# Patient Record
Sex: MALE | Race: WHITE
[De-identification: names, ages, dates, MRNs, and addresses within clinical notes are randomized per-mention and may not be internally consistent; named-entity substitution may affect disease eponyms.]

---

## 2018-01-13 ENCOUNTER — HOSPITAL ENCOUNTER (EMERGENCY)
Dept: HOSPITAL 4 - SED | Age: 78
Discharge: HOME | End: 2018-01-13
Payer: COMMERCIAL

## 2018-01-13 VITALS — WEIGHT: 134 LBS | HEIGHT: 69 IN | BODY MASS INDEX: 19.85 KG/M2

## 2018-01-13 VITALS — SYSTOLIC BLOOD PRESSURE: 164 MMHG

## 2018-01-13 VITALS — SYSTOLIC BLOOD PRESSURE: 155 MMHG

## 2018-01-13 DIAGNOSIS — K21.9: ICD-10-CM

## 2018-01-13 DIAGNOSIS — Z87.442: ICD-10-CM

## 2018-01-13 DIAGNOSIS — I10: Primary | ICD-10-CM

## 2018-01-13 DIAGNOSIS — Z79.899: ICD-10-CM

## 2018-01-13 DIAGNOSIS — Z97.8: ICD-10-CM

## 2018-01-13 DIAGNOSIS — Z90.49: ICD-10-CM

## 2018-08-10 LAB
BASOPHILS NFR BLD MANUAL: 0 % (ref 0–2)
EOSINOPHIL NFR BLD MANUAL: 2 % (ref 0–7)
ERYTHROCYTE [DISTWIDTH] IN BLOOD BY AUTOMATED COUNT: 19.6 % (ref 9–15)
HCT VFR BLD AUTO: 33.3 % (ref 36–54)
HGB BLD-MCNC: 11.3 G/DL (ref 14–18)
LYMPHOCYTES NFR BLD MANUAL: 22 % (ref 20–46)
MCH RBC QN AUTO: 37 PG (ref 27–31)
MCHC RBC AUTO-ENTMCNC: 34 % (ref 32–36)
MCV RBC AUTO: 109 FL (ref 79–98)
MONOCYTES # BLD MANUAL: 6 % (ref 0–11)
NEUTS BAND NFR BLD MANUAL: 2 % (ref 0–6)
PLATELET # BLD AUTO: 181 K/UL (ref 130–430)
RBC # BLD AUTO: 3.06 MIL/UL (ref 4.2–6.2)
WBC # BLD AUTO: 7.7 K/UL (ref 4.8–10.8)

## 2018-08-16 ENCOUNTER — HOSPITAL ENCOUNTER (INPATIENT)
Dept: HOSPITAL 4 - SMU | Age: 78
LOS: 1 days | Discharge: HOME | DRG: 627 | End: 2018-08-17
Attending: OTOLARYNGOLOGY | Admitting: OTOLARYNGOLOGY
Payer: COMMERCIAL

## 2018-08-16 VITALS — HEIGHT: 69 IN | BODY MASS INDEX: 19.26 KG/M2 | WEIGHT: 130 LBS

## 2018-08-16 VITALS — SYSTOLIC BLOOD PRESSURE: 138 MMHG

## 2018-08-16 VITALS — SYSTOLIC BLOOD PRESSURE: 120 MMHG

## 2018-08-16 DIAGNOSIS — I25.10: ICD-10-CM

## 2018-08-16 DIAGNOSIS — J44.9: ICD-10-CM

## 2018-08-16 DIAGNOSIS — E04.1: Primary | ICD-10-CM

## 2018-08-16 DIAGNOSIS — Z95.5: ICD-10-CM

## 2018-08-16 DIAGNOSIS — Z91.011: ICD-10-CM

## 2018-08-16 DIAGNOSIS — Z79.899: ICD-10-CM

## 2018-08-16 DIAGNOSIS — K21.9: ICD-10-CM

## 2018-08-16 DIAGNOSIS — Z90.49: ICD-10-CM

## 2018-08-16 DIAGNOSIS — I10: ICD-10-CM

## 2018-08-16 DIAGNOSIS — H40.9: ICD-10-CM

## 2018-08-16 DIAGNOSIS — Z88.6: ICD-10-CM

## 2018-08-16 LAB
ALBUMIN SERPL BCP-MCNC: 3.4 G/DL (ref 3.4–4.8)
CALCIUM SERPL-MCNC: 8.2 MG/DL (ref 8.4–11)

## 2018-08-16 PROCEDURE — C1782 MORCELLATOR: HCPCS

## 2018-08-16 PROCEDURE — 0GTK0ZZ RESECTION OF THYROID GLAND, OPEN APPROACH: ICD-10-PCS | Performed by: OTOLARYNGOLOGY

## 2018-08-16 PROCEDURE — 4A1174G MONITORING OF PERIPHERAL NERVOUS ELECTRICAL ACTIVITY, INTRAOPERATIVE, VIA NATURAL OR ARTIFICIAL OPENING: ICD-10-PCS | Performed by: OTOLARYNGOLOGY

## 2018-08-16 RX ADMIN — POTASSIUM CHLORIDE, DEXTROSE MONOHYDRATE AND SODIUM CHLORIDE SCH MLS/HR: 150; 5; 450 INJECTION, SOLUTION INTRAVENOUS at 14:54

## 2018-08-16 RX ADMIN — CALCIUM SCH MG: 500 TABLET ORAL at 21:01

## 2018-08-17 VITALS — SYSTOLIC BLOOD PRESSURE: 150 MMHG

## 2018-08-17 VITALS — SYSTOLIC BLOOD PRESSURE: 117 MMHG

## 2018-08-17 VITALS — SYSTOLIC BLOOD PRESSURE: 143 MMHG

## 2018-08-17 VITALS — SYSTOLIC BLOOD PRESSURE: 138 MMHG

## 2018-08-17 VITALS — SYSTOLIC BLOOD PRESSURE: 144 MMHG

## 2018-08-17 VITALS — SYSTOLIC BLOOD PRESSURE: 125 MMHG

## 2018-08-17 LAB
ALBUMIN SERPL BCP-MCNC: 3.3 G/DL (ref 3.4–4.8)
CALCIUM SERPL-MCNC: 8.3 MG/DL (ref 8.4–11)

## 2018-08-17 RX ADMIN — CALCIUM SCH MG: 500 TABLET ORAL at 15:53

## 2018-08-17 RX ADMIN — POTASSIUM CHLORIDE, DEXTROSE MONOHYDRATE AND SODIUM CHLORIDE SCH MLS/HR: 150; 5; 450 INJECTION, SOLUTION INTRAVENOUS at 10:39

## 2018-08-17 RX ADMIN — CALCIUM SCH MG: 500 TABLET ORAL at 08:11

## 2018-08-17 RX ADMIN — POTASSIUM CHLORIDE, DEXTROSE MONOHYDRATE AND SODIUM CHLORIDE SCH MLS/HR: 150; 5; 450 INJECTION, SOLUTION INTRAVENOUS at 00:49

## 2019-03-04 ENCOUNTER — HOSPITAL ENCOUNTER (INPATIENT)
Dept: HOSPITAL 4 - SED | Age: 79
LOS: 2 days | Discharge: HOME | DRG: 204 | End: 2019-03-06
Attending: SPECIALIST | Admitting: SPECIALIST
Payer: COMMERCIAL

## 2019-03-04 VITALS — BODY MASS INDEX: 19.11 KG/M2 | WEIGHT: 129 LBS | HEIGHT: 69 IN

## 2019-03-04 VITALS — SYSTOLIC BLOOD PRESSURE: 186 MMHG

## 2019-03-04 VITALS — SYSTOLIC BLOOD PRESSURE: 135 MMHG

## 2019-03-04 VITALS — SYSTOLIC BLOOD PRESSURE: 148 MMHG

## 2019-03-04 VITALS — SYSTOLIC BLOOD PRESSURE: 146 MMHG

## 2019-03-04 DIAGNOSIS — Z95.1: ICD-10-CM

## 2019-03-04 DIAGNOSIS — Z88.8: ICD-10-CM

## 2019-03-04 DIAGNOSIS — K21.9: ICD-10-CM

## 2019-03-04 DIAGNOSIS — Z79.02: ICD-10-CM

## 2019-03-04 DIAGNOSIS — R91.8: ICD-10-CM

## 2019-03-04 DIAGNOSIS — H40.9: ICD-10-CM

## 2019-03-04 DIAGNOSIS — Z90.49: ICD-10-CM

## 2019-03-04 DIAGNOSIS — I10: ICD-10-CM

## 2019-03-04 DIAGNOSIS — D64.9: ICD-10-CM

## 2019-03-04 DIAGNOSIS — R04.2: Primary | ICD-10-CM

## 2019-03-04 DIAGNOSIS — E87.1: ICD-10-CM

## 2019-03-04 DIAGNOSIS — I25.10: ICD-10-CM

## 2019-03-04 LAB
ALBUMIN SERPL BCP-MCNC: 3 G/DL (ref 3.4–4.8)
ALT SERPL W P-5'-P-CCNC: 15 U/L (ref 12–78)
ANION GAP SERPL CALCULATED.3IONS-SCNC: 6 MMOL/L (ref 5–15)
AST SERPL W P-5'-P-CCNC: 17 U/L (ref 10–37)
BASOPHILS # BLD AUTO: 0.1 K/UL (ref 0–0.2)
BASOPHILS NFR BLD AUTO: 0.5 % (ref 0–2)
BILIRUB SERPL-MCNC: 0.4 MG/DL (ref 0–1)
BUN SERPL-MCNC: 18 MG/DL (ref 8–21)
CALCIUM SERPL-MCNC: 8.5 MG/DL (ref 8.4–11)
CHLORIDE SERPL-SCNC: 95 MMOL/L (ref 98–107)
CREAT SERPL-MCNC: 0.98 MG/DL (ref 0.55–1.3)
EOSINOPHIL # BLD AUTO: 0.1 K/UL (ref 0–0.4)
EOSINOPHIL NFR BLD AUTO: 0.7 % (ref 0–4)
ERYTHROCYTE [DISTWIDTH] IN BLOOD BY AUTOMATED COUNT: 19.4 % (ref 9–15)
GFR SERPL CREATININE-BSD FRML MDRD: (no result) ML/MIN (ref 90–?)
GLUCOSE SERPL-MCNC: 121 MG/DL (ref 70–99)
HCT VFR BLD AUTO: 27.6 % (ref 36–54)
HGB BLD-MCNC: 9.4 G/DL (ref 14–18)
INR PPP: 1 (ref 0.8–1.2)
LYMPHOCYTES # BLD AUTO: 1 K/UL (ref 1–5.5)
LYMPHOCYTES NFR BLD AUTO: 9.4 % (ref 20.5–51.5)
MCH RBC QN AUTO: 37 PG (ref 27–31)
MCHC RBC AUTO-ENTMCNC: 34 % (ref 32–36)
MCV RBC AUTO: 109 FL (ref 79–98)
MONOCYTES # BLD MANUAL: 0.9 K/UL (ref 0–1)
MONOCYTES # BLD MANUAL: 8.1 % (ref 1.7–9.3)
NEUTROPHILS # BLD AUTO: 9 K/UL (ref 1.8–7.7)
NEUTROPHILS NFR BLD AUTO: 81.3 % (ref 40–70)
PLATELET # BLD AUTO: 384 K/UL (ref 130–430)
POTASSIUM SERPL-SCNC: 4.3 MMOL/L (ref 3.5–5.1)
PROTHROMBIN TIME: 10 SECS (ref 9.5–12.5)
RBC # BLD AUTO: 2.54 MIL/UL (ref 4.2–6.2)
SODIUM SERPLBLD-SCNC: 127 MMOL/L (ref 136–145)
WBC # BLD AUTO: 11.1 K/UL (ref 4.8–10.8)

## 2019-03-04 RX ADMIN — DORZOLAMIDE HYDROCHLORIDE AND TIMOLOL MALEATE SCH ML: 20; 5 SOLUTION/ DROPS OPHTHALMIC at 21:00

## 2019-03-04 RX ADMIN — DEXTROSE MONOHYDRATE SCH MLS/HR: 50 INJECTION, SOLUTION INTRAVENOUS at 21:27

## 2019-03-04 NOTE — NUR
OPENING NOTES

Pt and endorsement received from day shift nurse. Pt is AAOx4, lying in bed. Wife [place] at 
bedside. No complains of pain at this time. No signs of acute distress or SOB noted. 
Encouraged to use call light when needed. Safety precautions in place with 3 side rails up, 
bed alarm on, locked and in lowest position. Call light with pt. Will continue to monitor.

## 2019-03-04 NOTE — NUR
Notes

In bed, family at bedside. Admit nurse at bedside doing admission data. No acute distress 
noted. make patient comfortable in bed. will endorse.

## 2019-03-04 NOTE — NUR
Admission Note

Received patient from ER with diagnosis of hemoptasis. Initial Plan of Care 
discussed-patient verbalized understanding. Family at bedside. Oriented to room, call light, 
pain management and safety.
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
## 2019-03-04 NOTE — NUR
RESTING

Pt is resting in bed with both eyes closed. With visible chest rise and fall with unlabored 
breathing noted. No complains of pain and no signs of acute distress or SOB noted. Safety 
precautions in place and call light with pt. Will continue to monitor.

## 2019-03-04 NOTE — NUR
Patient will be admitted to care of .  Admitted to telemetry unit.  
Will go to room 114B.    Summary report printed. Report will be given at 
bedside.

## 2019-03-05 VITALS — SYSTOLIC BLOOD PRESSURE: 126 MMHG

## 2019-03-05 VITALS — SYSTOLIC BLOOD PRESSURE: 143 MMHG

## 2019-03-05 VITALS — SYSTOLIC BLOOD PRESSURE: 132 MMHG

## 2019-03-05 VITALS — SYSTOLIC BLOOD PRESSURE: 149 MMHG

## 2019-03-05 LAB
ANION GAP SERPL CALCULATED.3IONS-SCNC: 7 MMOL/L (ref 5–15)
BUN SERPL-MCNC: 15 MG/DL (ref 8–21)
CALCIUM SERPL-MCNC: 8 MG/DL (ref 8.4–11)
CHLORIDE SERPL-SCNC: 97 MMOL/L (ref 98–107)
CREAT SERPL-MCNC: 0.94 MG/DL (ref 0.55–1.3)
GFR SERPL CREATININE-BSD FRML MDRD: (no result) ML/MIN (ref 90–?)
GLUCOSE SERPL-MCNC: 98 MG/DL (ref 70–99)
POTASSIUM SERPL-SCNC: 3.9 MMOL/L (ref 3.5–5.1)
SODIUM SERPLBLD-SCNC: 130 MMOL/L (ref 136–145)
TSH SERPL DL<=0.05 MIU/L-ACNC: 3.1 UIU/ML (ref 0.34–4.82)

## 2019-03-05 RX ADMIN — LOSARTAN POTASSIUM SCH MG: 50 TABLET, FILM COATED ORAL at 08:37

## 2019-03-05 RX ADMIN — FAMOTIDINE SCH MG: 20 TABLET, FILM COATED ORAL at 08:21

## 2019-03-05 RX ADMIN — DORZOLAMIDE HYDROCHLORIDE AND TIMOLOL MALEATE SCH DROPS: 20; 5 SOLUTION/ DROPS OPHTHALMIC at 08:19

## 2019-03-05 RX ADMIN — FAMOTIDINE SCH MG: 20 TABLET, FILM COATED ORAL at 08:18

## 2019-03-05 RX ADMIN — DORZOLAMIDE HYDROCHLORIDE AND TIMOLOL MALEATE SCH DROPS: 20; 5 SOLUTION/ DROPS OPHTHALMIC at 20:42

## 2019-03-05 RX ADMIN — LOSARTAN POTASSIUM SCH MG: 50 TABLET, FILM COATED ORAL at 08:18

## 2019-03-05 RX ADMIN — LEVOTHYROXINE SODIUM SCH MG: 100 TABLET ORAL at 05:21

## 2019-03-05 RX ADMIN — DEXTROSE MONOHYDRATE SCH MLS/HR: 50 INJECTION, SOLUTION INTRAVENOUS at 17:47

## 2019-03-05 RX ADMIN — FAMOTIDINE SCH MG: 20 TABLET, FILM COATED ORAL at 09:00

## 2019-03-05 NOTE — NUR
Medication

Patient is very sensitive to medication and prefer to take his own pill , refused to take 
the converted medication , pharmacy informed , wife will bring patient medicines to be 
verified by the pharmacist.

## 2019-03-05 NOTE — NUR
Rounds:



Patient is resting in bed with eyes closed. Does not show any acute distress. Call light 
with patient. Will continue to monitor.

## 2019-03-05 NOTE — NUR
OPENING NOTE

At initial assessment, patient is awake, alert and oriented x4, denies any pain/discomfort 
at this time. Breathing even and unlabored. IV site on the RAC 20 G patent and intact. Dr. Wilburn seen and examined the patient at the bedside. Safety and fall precautions in place, 
bed low and locked, side rails up x3, call light within reach, will continue to monitor.

## 2019-03-05 NOTE — NUR
CLOSING NOTES

Pt is resting in bed with both eyes closed. With visible chest rise and fall with unlabored 
breathing noted. No complains of pain at this time. No signs of acute distress or SOB noted. 
All needs attended throughout the shift. Safety precautions maintained with 3 side rails up, 
bed alarm on, locked and in lowest position. Call light with pt. Will continue to monitor.

## 2019-03-05 NOTE — NUR
RESTING

Pt is resting in bed with both eyes closed. With visible chest rise and fall with unlabored 
breathing noted. No complains of pain at this time. No signs of acute distress or SOB noted. 
Safety precautions in place and call light with pt. Will continue to monitor.

## 2019-03-05 NOTE — NUR
Initial note:



Received report from dayshift RN. Patient is in bed watching TV. No respiratory distress, no 
complaints of pain or shortness of breath. Alert and oriented x4. IV noted to patient's 
right AC, site is patent and benign. Call light with patient. Patient refused bed alarm 
despite education. Safety and fall precautions in place. Will continue to monitor.

## 2019-03-05 NOTE — NUR
ROUNDS

Patient is awake, in stable condition. No respiratory distress noted. Safety precautions 
observed, call light within reach, will continue to monitor.

## 2019-03-05 NOTE — NUR
MEDICATION

Home medication brought by patient's wife and medication sent to pharmacy for verification.

## 2019-03-05 NOTE — NUR
Med pass:



Administered scheduled eye drops at this time. Education provided regarding medications, 
patient verbalized understanding. Bed alarm refused despite education. Call light with 
patient. Will continue monitoring.

## 2019-03-05 NOTE — NUR
CLOSING NOTE

Patient is awake, no respiratory distress noted. Denies any pain/discomfort at this time. IV 
site remains patent and intact, saline locked. No further needs at this time. Safety and 
fall precautions observed, bed low and locked, side rails up x2, call light within reach, 
all needs met and anticipated throughout the shift, will endorse plan of care.

## 2019-03-05 NOTE — NUR
ROUNDS

Patient is awake, denies any pain/discomfort. No coughing noted. Breathing even and 
unlabored. IV site remains patent and intact. Wife at the bedside. Safety precautions 
observed, call light within reach.

## 2019-03-05 NOTE — NUR
RESTING

Pt is resting in bed with both eyes closed. With visible chest rise and fall with unlabored 
breathing noted. No complains of pain or discomfort. No signs of acute distress or SOB 
noted. Safety precautions in place and call light with pt. Will continue to monitor.

## 2019-03-05 NOTE — NUR
Consent for bronchoscopy

Patient verbalized understanding of bronchoscopy and indication as explained by Dr. Wilburn, 
instructed NPO  post midnight.

## 2019-03-05 NOTE — NUR
ROUNDS

Patient is awake, no respiratory distress noted. Denies any pain/discomfort at this time. 
Safety precautions observed, instructed patient to use call light when needing assistance, 
patient verbalized understanding. Call light within reach.

## 2019-03-06 VITALS — SYSTOLIC BLOOD PRESSURE: 137 MMHG

## 2019-03-06 VITALS — SYSTOLIC BLOOD PRESSURE: 127 MMHG

## 2019-03-06 VITALS — SYSTOLIC BLOOD PRESSURE: 143 MMHG

## 2019-03-06 VITALS — SYSTOLIC BLOOD PRESSURE: 148 MMHG

## 2019-03-06 VITALS — SYSTOLIC BLOOD PRESSURE: 144 MMHG

## 2019-03-06 PROCEDURE — 0B9F8ZX DRAINAGE OF RIGHT LOWER LUNG LOBE, VIA NATURAL OR ARTIFICIAL OPENING ENDOSCOPIC, DIAGNOSTIC: ICD-10-PCS | Performed by: SPECIALIST

## 2019-03-06 PROCEDURE — 0BDF8ZX EXTRACTION OF RIGHT LOWER LUNG LOBE, VIA NATURAL OR ARTIFICIAL OPENING ENDOSCOPIC, DIAGNOSTIC: ICD-10-PCS | Performed by: SPECIALIST

## 2019-03-06 RX ADMIN — MIDAZOLAM HYDROCHLORIDE ONE MG: 1 INJECTION, SOLUTION INTRAMUSCULAR; INTRAVENOUS at 11:00

## 2019-03-06 RX ADMIN — FENTANYL CITRATE ONE MCG: 50 INJECTION, SOLUTION INTRAMUSCULAR; INTRAVENOUS at 11:00

## 2019-03-06 RX ADMIN — MIDAZOLAM HYDROCHLORIDE ONE MG: 1 INJECTION, SOLUTION INTRAMUSCULAR; INTRAVENOUS at 12:58

## 2019-03-06 RX ADMIN — FAMOTIDINE SCH MG: 20 TABLET, FILM COATED ORAL at 12:41

## 2019-03-06 RX ADMIN — MIDAZOLAM HYDROCHLORIDE ONE MG: 1 INJECTION, SOLUTION INTRAMUSCULAR; INTRAVENOUS at 11:04

## 2019-03-06 RX ADMIN — FENTANYL CITRATE ONE MCG: 50 INJECTION, SOLUTION INTRAMUSCULAR; INTRAVENOUS at 10:58

## 2019-03-06 RX ADMIN — LEVOTHYROXINE SODIUM SCH MG: 100 TABLET ORAL at 06:00

## 2019-03-06 RX ADMIN — MIDAZOLAM HYDROCHLORIDE ONE MG: 1 INJECTION, SOLUTION INTRAMUSCULAR; INTRAVENOUS at 11:02

## 2019-03-06 RX ADMIN — DORZOLAMIDE HYDROCHLORIDE AND TIMOLOL MALEATE SCH DROPS: 20; 5 SOLUTION/ DROPS OPHTHALMIC at 12:40

## 2019-03-06 RX ADMIN — FAMOTIDINE SCH MG: 20 TABLET, FILM COATED ORAL at 12:39

## 2019-03-06 RX ADMIN — MIDAZOLAM HYDROCHLORIDE ONE MG: 1 INJECTION, SOLUTION INTRAMUSCULAR; INTRAVENOUS at 11:07

## 2019-03-06 NOTE — NUR
Rounds:



Patient is asleep. No acute distress. Respirations even and unlabored. Call light with 
patient, will continue monitoring.

## 2019-03-06 NOTE — NUR
Closing note:



Patient is resting in bed, no distress. Respirations even and unlabored on room air. All 
needs met. Safety and fall precautions observed throughout shift. Call light with patient. 
Will continue to monitor until endorsement of care.

## 2019-03-06 NOTE — NUR
NPO:



NPO status now in place for patient's bronchoscopy tomorrow. Patient verbalized 
understanding of NPO status. Removed food and beverages from patient's bedside. Call light 
with patient. Will continue monitoring.

## 2019-03-06 NOTE — NUR
pt came back from bronchoscopy, vitals wnl patients normal limits, pt has not fever. o2 sat 
on room air was 92%, bilat lung sounds audible. lung sound clear. will cont to monitor.

## 2019-03-06 NOTE — NUR
Opening notes,

Pt in bed, no c/o pain, pt is aaox4, denies pain, no sob, no distress. no c/o abd pain,  no 
bloody sputum or phlegm . pt is afebrile. saline lock is intact and patent.  no s/s of 
infiltration on the iv site. safety precaution in place. call light in reach. bed in low 
position. encouraged to call for assist and pain med or any concerns. will cont to monitor.

## 2019-03-06 NOTE — NUR
D/C Patient

Patient given medication reconciliation form and D/C instructions. Exit Care provided. 
Patient verbalized understanding. MD discussed with patient the results and treatment 
provided. Ambulatory with steady gait for discharge to home. Patient in stable condition, ID 
band removed. IV catheter removed, intact and dressing applied, no active bleeding. No  Rx  
given. Patient educated on pain management. All belongings sent with patient.

## 2019-03-07 NOTE — NUR
Discharge Follow Up Phone Call

LCSW phoned patient, [phone]. Patient stated he was doing fine. He made a follow up 
appointment with Dr Wilburn for 3/13/19. He had no questions about his discharge instructions 
and has stopped Plavix and aspirin, as directed, until his follow up appointment. No 
questions or concerns.